# Patient Record
Sex: FEMALE | Race: WHITE | NOT HISPANIC OR LATINO | URBAN - NONMETROPOLITAN AREA
[De-identification: names, ages, dates, MRNs, and addresses within clinical notes are randomized per-mention and may not be internally consistent; named-entity substitution may affect disease eponyms.]

---

## 2019-04-11 ENCOUNTER — IMPORTED ENCOUNTER (OUTPATIENT)
Dept: URBAN - NONMETROPOLITAN AREA CLINIC 1 | Facility: CLINIC | Age: 84
End: 2019-04-11

## 2019-04-11 PROCEDURE — 92134 CPTRZ OPH DX IMG PST SGM RTA: CPT

## 2019-04-11 PROCEDURE — 92014 COMPRE OPH EXAM EST PT 1/>: CPT

## 2019-04-11 NOTE — PATIENT DISCUSSION
COAG OU. - Appears Stable stable IOP.- IOP's today were 14 OU - Continue Cosopt (generic) bid OU.- Continue Latanoprost qhs OU. -Reviewed older VF test with patient and seeing progressionn in OD would like to order updated VF at next available and then follow up in 4 months with patient. PCIOL OU stable in good position. Open Caps OU continue to monitor. AMD - dry-Explained dry AMD and advised that there are no treatments available at this time.-Continue AREDS 2 MVT ([employer]). -Continue Amsler grid monitoring daily. Pt is to contact our office if any changes are noted. - Reviewed OCT MAC that was done today discussed with patient. Epiretinal membrane OS-Discussed findings of exam in detail with the patient.-Discussed the signs of worsening of the disease. .Dry Eyes OU. Continue Artificial tears OU TID+.; 's Notes: Pt doesn't remeber PCPs nameVF- 3/30/18OCT MAC 3/30/18

## 2019-09-26 ENCOUNTER — IMPORTED ENCOUNTER (OUTPATIENT)
Dept: URBAN - NONMETROPOLITAN AREA CLINIC 1 | Facility: CLINIC | Age: 84
End: 2019-09-26

## 2019-09-26 PROCEDURE — 92012 INTRM OPH EXAM EST PATIENT: CPT

## 2019-09-26 PROCEDURE — 92083 EXTENDED VISUAL FIELD XM: CPT

## 2019-09-26 NOTE — PATIENT DISCUSSION
COAG OU. - Appears Stable stable IOP.- IOP's today were  - Continue Cosopt (generic) bid OU.- Continue Latanoprost qhs OU.-VF 24-2 done and interpreted today 9/26/2019:OD: possible superior arcuate scotoma partially invilving fixationOS: unreliable many false negatives possible rim artifact questionable nasal stepPCIOL OU stable in good position. Open Caps OU continue to monitor. AMD - dry-Explained dry AMD and advised that there are no treatments available at this time.-Continue AREDS 2 MVT ([employer]). -Continue Amsler grid monitoring daily. Pt is to contact our office if any changes are noted. - Reviewed OCT MAC that was done today discussed with patient. Epiretinal membrane OS-Discussed findings of exam in detail with the patient.-Discussed the signs of worsening of the disease. .Dry Eyes OU. Continue Artificial tears OU TID+.; 's Notes: Pt doesn't remeber PCPs nameVF- 9/26/2019OCT MAC 3/30/18

## 2020-09-10 ENCOUNTER — IMPORTED ENCOUNTER (OUTPATIENT)
Dept: URBAN - NONMETROPOLITAN AREA CLINIC 1 | Facility: CLINIC | Age: 85
End: 2020-09-10

## 2020-09-10 PROCEDURE — 92014 COMPRE OPH EXAM EST PT 1/>: CPT

## 2020-09-10 PROCEDURE — 92133 CPTRZD OPH DX IMG PST SGM ON: CPT

## 2020-09-10 NOTE — PATIENT DISCUSSION
POAG OU moderate stage:  - Discussed findings w/ pt today- Appears stable OU- IOP's today were 15 OU stable- C/Ds stable at 0.8 OD and 0.95 OS. - Continue Cosopt (generic) OU BID.- Continue Latanoprost OU QHS. -  VF 24-2 done previously and interpreted 9/26/2019:OD: possible superior arcuate scotoma partially invilving fixationOS: unreliable many false negatives possible rim artifact questionable nasal step-  Would like to get [facility] reading in near future-  Continue to monitor closely recommend every 4-6 months Pseuodphakia OU-  Discussed findings w/ pt today-  Stable doing well-  Hx of Yag PC OU open capsules noted-  Monitor yearly or PRN Dry ARMD OU/ERM OS:  -  Discussed findings w/ pt today-  Explained dry AMD and advised that there are no treatments available at this time. -  Continue AREDS 2 MVT ([employer]). -  Continue Amsler grid monitoring daily. Pt is to contact our office ASAP if any changes are noted. - Reviewed OCT MAC that was done today discussed with patient. - Contiue to monitor every 4-6 monthsDES OU-  Discussed findings w/ pt today-  Sign/symptoms typically associated discussed -  Continue Artificial tears OU TID+. -  Monitor PRN changesPVD OU-  Discussed findings of exam in detail with the patient. -  The risk of retinal detachment in patients with PVDs was discussed with the patient and the warning signs of retinal detachment were carefully reviewed with the patient. -  The patient was warned to return to the office or contact the ophthalmologist on call immediately if they experience signs of retinal detachment or changes in vision noted from today. -  Continue to monitor PRN; 's Notes: MR deferred 9/10/20DFE 9/10/2020VF  9/26/2019OCT ON 9/10/2020OCT MAC 9/10/2020Minh

## 2020-12-21 PROBLEM — H16.223: Noted: 2020-12-21

## 2020-12-21 PROBLEM — Z96.1: Noted: 2020-12-21

## 2020-12-21 PROBLEM — H35.372: Noted: 2020-12-21

## 2020-12-21 PROBLEM — H04.122: Noted: 2020-12-21

## 2020-12-21 PROBLEM — H43.393: Noted: 2020-12-21

## 2021-03-11 ENCOUNTER — IMPORTED ENCOUNTER (OUTPATIENT)
Dept: URBAN - NONMETROPOLITAN AREA CLINIC 1 | Facility: CLINIC | Age: 86
End: 2021-03-11

## 2021-03-11 PROCEDURE — 99213 OFFICE O/P EST LOW 20 MIN: CPT

## 2021-03-11 PROCEDURE — 76514 ECHO EXAM OF EYE THICKNESS: CPT

## 2021-03-11 NOTE — PATIENT DISCUSSION
POAG OU moderate stage:  - Discussed findings w/ pt today- Appears stable OU- IOP's today were 14 OU stable- C/Ds stable at 0.8 OD and 0.95 OS. - Continue Cosopt (generic) OU BID.- Continue Latanoprost OU QHS. -  VF 24-2 done previously and interpreted 9/26/2019:OD: possible superior arcuate scotoma partially invilving fixationOS: unreliable many false negatives possible rim artifact questionable nasal step-  Pach done 3/11/2021 by NL    [id number]  Continue to monitor closely recommend every 4-6 months Pseuodphakia OU-  Discussed findings w/ pt today-  Stable doing well-  Hx of Yag PC OU open capsules noted-  Monitor yearly or PRN Dry ARMD OU/ERM OS:  -  Discussed findings w/ pt today-  Explained dry AMD and advised that there are no treatments available at this time. -  Continue AREDS 2 MVT ([employer]). -  Continue Amsler grid monitoring daily. Pt is to contact our office ASAP if any changes are noted. - Reviewed OCT MAC that was done today discussed with patient. - Contiue to monitor every 4-6 monthsDES OU-  Discussed findings w/ pt today-  Sign/symptoms typically associated discussed -  Continue Artificial tears OU TID+. -  Monitor PRN changesPVD OU-  Discussed findings of exam in detail with the patient. -  The risk of retinal detachment in patients with PVDs was discussed with the patient and the warning signs of retinal detachment were carefully reviewed with the patient. -  The patient was warned to return to the office or contact the ophthalmologist on call immediately if they experience signs of retinal detachment or changes in vision noted from today. -  Continue to monitor PRN; 's Notes: MR deferred 9/10/20DFE 9/10/2020VF  9/26/2019OCT ON 9/10/2020OCT MAC 9/10/2020PACH 3/11/2021Gonio

## 2022-04-09 ASSESSMENT — PACHYMETRY
OD_CT_UM: 501; ADJ: VTHIN
OS_CT_UM: 500; ADJ: VTHIN
OD_CT_UM: 501; ADJ: VTHIN
OD_CT_UM: 501; ADJ: VTHIN

## 2022-04-09 ASSESSMENT — TONOMETRY
OD_IOP_MMHG: 15
OS_IOP_MMHG: 15
OD_IOP_MMHG: 15
OD_IOP_MMHG: 14
OD_IOP_MMHG: 14
OS_IOP_MMHG: 14

## 2022-04-09 ASSESSMENT — VISUAL ACUITY
OU_CC: 20/25-3
OD_PH: 20/30
OD_SC: 20/40
OS_SC: 20/70-1
OD_SC: 20/50-1
OS_SC: 20/40
OS_CC: 20/50
OD_PH: 20/30
OS_SC: 20/30
OD_CC: 20/60
OS_CC: 20/70-
OD_CC: J2
OS_CC: J2
OD_CC: 20/100
OS_SC: 20/20

## 2022-09-13 ENCOUNTER — COMPREHENSIVE EXAM (OUTPATIENT)
Dept: URBAN - NONMETROPOLITAN AREA CLINIC 4 | Facility: CLINIC | Age: 87
End: 2022-09-13

## 2022-09-13 DIAGNOSIS — H40.1132: ICD-10-CM

## 2022-09-13 DIAGNOSIS — H35.372: ICD-10-CM

## 2022-09-13 DIAGNOSIS — H35.3131: ICD-10-CM

## 2022-09-13 PROCEDURE — 92014 COMPRE OPH EXAM EST PT 1/>: CPT

## 2022-09-13 ASSESSMENT — VISUAL ACUITY
OS_SC: 20/30
OD_SC: 20/80
OU_SC: 20/200
OU_SC: 20/30
OD_SC: 20/30

## 2022-09-13 ASSESSMENT — TONOMETRY
OS_IOP_MMHG: 15
OD_IOP_MMHG: 15

## 2022-09-13 NOTE — PATIENT DISCUSSION
POAG OU moderate stage:  - Discussed findings w/ pt today- Appears stable OU- IOP's today were 14 OU stable- C/Ds stable at 0.8 OD and 0.95 OS. - Continue Cosopt (generic) OU BID.- Continue Latanoprost OU QHS. -  VF 24-2 done previously and interpreted 9/26/2019:OD: possible superior arcuate scotoma partially invilving fixationOS: unreliable many false negatives possible rim artifact questionable nasal step-  Pach done 3/11/2021 by NL    [id number]-  Continue to monitor closely recommend every 4-6 months Pseuodphakia OU-  Discussed findings w/ pt today-  Stable doing well-  Hx of Yag PC OU open capsules noted-  Monitor yearly or PRN Dry ARMD OU/ERM OS:  -  Discussed findings w/ pt today-  Explained dry AMD and advised that there are no treatments available at this time. -  Continue AREDS 2 MVT ([employer]). -  Continue Amsler grid monitoring daily. Pt is to contact our office ASAP if any changes are noted. - Reviewed OCT MAC that was done today discussed with patient. - Contiue to monitor every 4-6 monthsDES OU-  Discussed findings w/ pt today-  Sign/symptoms typically associated discussed -  Continue Artificial tears OU TID+. -  Monitor PRN changesPVD OU-  Discussed findings of exam in detail with the patient. -  The risk of retinal detachment in patients with PVDs was discussed with the patient and the warning signs of retinal detachment were carefully reviewed with the patient. -  The patient was warned to return to the office or contact the ophthalmologist on call immediately if they experience signs of retinal detachment or changes in vision noted from today. -  Continue to monitor PRN; 's Notes: MR deferred 9/10/20DFE 9/10/2020VF  9/26/2019OCT ON 9/10/2020OCT MAC 9/10/2020PACH 3/11/2021Gonio.

## 2023-04-17 ENCOUNTER — FOLLOW UP (OUTPATIENT)
Dept: RURAL CLINIC 1 | Facility: CLINIC | Age: 88
End: 2023-04-17

## 2023-04-17 DIAGNOSIS — H40.1132: ICD-10-CM

## 2023-04-17 DIAGNOSIS — H16.223: ICD-10-CM

## 2023-04-17 DIAGNOSIS — H35.3131: ICD-10-CM

## 2023-04-17 DIAGNOSIS — Z96.1: ICD-10-CM

## 2023-04-17 DIAGNOSIS — H35.372: ICD-10-CM

## 2023-04-17 DIAGNOSIS — H43.393: ICD-10-CM

## 2023-04-17 PROCEDURE — 92133 CPTRZD OPH DX IMG PST SGM ON: CPT

## 2023-04-17 PROCEDURE — 92014 COMPRE OPH EXAM EST PT 1/>: CPT

## 2023-04-17 ASSESSMENT — VISUAL ACUITY
OS_CC: 20/40
OS_CC: 20/400
OU_SC: CF 2FT
OD_CC: 20/40
OU_CC: 20/40
OS_SC: CF 2FT
OD_SC: CF 2FT
OD_CC: 20/50-1
OU_CC: 20/50

## 2023-04-17 ASSESSMENT — TONOMETRY
OS_IOP_MMHG: 11
OD_IOP_MMHG: 12

## 2023-12-19 ENCOUNTER — ESTABLISHED PATIENT (OUTPATIENT)
Dept: RURAL CLINIC 2 | Facility: CLINIC | Age: 88
End: 2023-12-19

## 2023-12-19 DIAGNOSIS — Z96.1: ICD-10-CM

## 2023-12-19 DIAGNOSIS — H43.813: ICD-10-CM

## 2023-12-19 DIAGNOSIS — H52.13: ICD-10-CM

## 2023-12-19 DIAGNOSIS — H52.4: ICD-10-CM

## 2023-12-19 DIAGNOSIS — H35.372: ICD-10-CM

## 2023-12-19 DIAGNOSIS — H35.3131: ICD-10-CM

## 2023-12-19 DIAGNOSIS — H40.1132: ICD-10-CM

## 2023-12-19 DIAGNOSIS — H16.223: ICD-10-CM

## 2023-12-19 PROCEDURE — 99214 OFFICE O/P EST MOD 30 MIN: CPT

## 2023-12-19 PROCEDURE — 92015 DETERMINE REFRACTIVE STATE: CPT

## 2023-12-19 ASSESSMENT — VISUAL ACUITY
OS_SC: 20/400
OD_PH: 20/70-1
OS_PH: 20/100
OD_SC: 20/400

## 2023-12-19 ASSESSMENT — TONOMETRY
OD_IOP_MMHG: 16
OS_IOP_MMHG: 16

## 2024-06-19 ENCOUNTER — FOLLOW UP (OUTPATIENT)
Dept: URBAN - NONMETROPOLITAN AREA CLINIC 4 | Facility: CLINIC | Age: 89
End: 2024-06-19

## 2024-06-19 DIAGNOSIS — H40.1132: ICD-10-CM

## 2024-06-19 DIAGNOSIS — H35.3131: ICD-10-CM

## 2024-06-19 DIAGNOSIS — H35.372: ICD-10-CM

## 2024-06-19 DIAGNOSIS — H43.813: ICD-10-CM

## 2024-06-19 DIAGNOSIS — Z96.1: ICD-10-CM

## 2024-06-19 DIAGNOSIS — H16.223: ICD-10-CM

## 2024-06-19 PROCEDURE — 99213 OFFICE O/P EST LOW 20 MIN: CPT

## 2024-06-19 ASSESSMENT — VISUAL ACUITY
OD_PH: 20/80
OS_SC: 20/400
OS_PH: 20/80
OD_SC: 20/200

## 2024-06-19 ASSESSMENT — TONOMETRY
OS_IOP_MMHG: 14
OD_IOP_MMHG: 13